# Patient Record
Sex: FEMALE | Race: ASIAN | Employment: FULL TIME | URBAN - METROPOLITAN AREA
[De-identification: names, ages, dates, MRNs, and addresses within clinical notes are randomized per-mention and may not be internally consistent; named-entity substitution may affect disease eponyms.]

---

## 2017-05-06 ENCOUNTER — HOSPITAL ENCOUNTER (OUTPATIENT)
Dept: CT IMAGING | Facility: HOSPITAL | Age: 62
Discharge: HOME | End: 2017-05-06

## 2017-05-06 VITALS — SYSTOLIC BLOOD PRESSURE: 139 MMHG | HEART RATE: 64 BPM | DIASTOLIC BLOOD PRESSURE: 78 MMHG

## 2017-05-06 DIAGNOSIS — Z13.220 SCREENING CHOLESTEROL LEVEL: ICD-10-CM

## 2021-07-07 PROBLEM — H40.9 GLAUCOMA: Status: ACTIVE | Noted: 2021-07-07

## 2021-07-07 PROBLEM — M17.10 OA (OSTEOARTHRITIS) OF KNEE: Status: ACTIVE | Noted: 2018-08-24

## 2021-07-07 PROBLEM — D86.9 SARCOID: Status: ACTIVE | Noted: 2021-07-07

## 2021-07-07 PROBLEM — R73.09 BLOOD GLUCOSE ABNORMAL: Status: ACTIVE | Noted: 2019-09-01

## 2021-07-07 PROBLEM — M17.9 OA (OSTEOARTHRITIS) OF KNEE: Status: ACTIVE | Noted: 2018-08-24

## 2021-07-07 PROBLEM — R73.09 BLOOD GLUCOSE ABNORMAL: Status: RESOLVED | Noted: 2019-09-01 | Resolved: 2021-07-07

## 2021-07-07 PROBLEM — R73.9 HYPERGLYCEMIA: Status: ACTIVE | Noted: 2021-07-07

## 2021-07-07 PROBLEM — E78.00 PURE HYPERCHOLESTEROLEMIA: Status: ACTIVE | Noted: 2018-10-24

## 2021-08-09 ENCOUNTER — TELEPHONE (OUTPATIENT)
Dept: SURGERY | Facility: CLINIC | Age: 66
End: 2021-08-09

## 2021-08-09 NOTE — TELEPHONE ENCOUNTER
Pt calling to schedule NPOV for tinnitus and abnormal MRI.   Pls review MRI results and advise if pt should see Neurosurgery or IR

## 2021-08-24 ENCOUNTER — OFFICE VISIT (OUTPATIENT)
Dept: SURGERY | Facility: CLINIC | Age: 66
End: 2021-08-24
Payer: MEDICARE

## 2021-08-24 VITALS — HEART RATE: 64 BPM | DIASTOLIC BLOOD PRESSURE: 86 MMHG | SYSTOLIC BLOOD PRESSURE: 140 MMHG

## 2021-08-24 DIAGNOSIS — R93.0 ABNORMAL MRI OF HEAD: ICD-10-CM

## 2021-08-24 DIAGNOSIS — H93.11 TINNITUS OF RIGHT EAR: Primary | ICD-10-CM

## 2021-08-24 PROBLEM — H59.023: Status: ACTIVE | Noted: 2018-11-18

## 2021-08-24 PROCEDURE — 99204 OFFICE O/P NEW MOD 45 MIN: CPT | Performed by: RADIOLOGY

## 2021-08-24 NOTE — PROGRESS NOTES
8/24/2021      Dear Gaby Ball,  I had the pleasure of seeing your patient, Jayla Cervantes today,8/24/2021   .   SUBJECTIVE     Chief Complaint: Steve Koehler is a 77year old female here today for consultation for Neurologic Problem (NP referred for alea a idiopathic intracranial hypertension. She denies any significant personal headache history. Other than the headache in January 2021 in the right temple region, she has not had any more daily headaches. That she denies any visual symptoms.     Past Medi Systems:     Hand Dominance: right  General: no symptoms reported  Neuro: no symptoms reported  Head: dizziness/spinning and ringing in ears  Dizziness treated by PCP-pt doing PT  Musculoskeletal: no symptoms reported  Cardiovascular: no symptoms reported Pulmonary:      Effort: Pulmonary effort is normal. No respiratory distress. Breath sounds: Normal breath sounds. No stridor. No wheezing. Musculoskeletal:         General: No swelling, tenderness, deformity or signs of injury.  Normal range of m inflammatory changes, progressed as compared to the prior CT exam.   5. Minimal white matter disease burden.  Leukoariosis scale (Fazekas):Grade 1 (Mild): focal or   punctate lesions  NO abnormal restricted diffusion is seen to suggest acute territorial isc cause of her symptoms. 3.   In order to both exclude a skull base arteriovenous shunt and screen for any venous abnormalities, I have asked her to obtain an MRA of the head and an MR venogram of the head, both without contrast.  This will be a reasonably

## 2021-08-24 NOTE — PROGRESS NOTES
Review of Systems:    Hand Dominance: right  General: no symptoms reported  Neuro: no symptoms reported  Head: dizziness/spinning and ringing in ears  Dizziness treated by PCP-pt doing PT  Musculoskeletal: no symptoms reported  Cardiovascular: no symptoms

## 2021-09-20 PROBLEM — R93.0 ABNORMAL MRI OF HEAD: Status: RESOLVED | Noted: 2021-08-24 | Resolved: 2021-09-20

## 2021-09-20 PROBLEM — R73.9 HYPERGLYCEMIA: Status: RESOLVED | Noted: 2021-07-07 | Resolved: 2021-09-20

## 2021-09-20 PROBLEM — H59.023: Status: RESOLVED | Noted: 2018-11-18 | Resolved: 2021-09-20

## 2024-01-18 ENCOUNTER — HOSPITAL ENCOUNTER (EMERGENCY)
Age: 69
Discharge: HOME | End: 2024-01-18
Attending: EMERGENCY MEDICINE
Payer: MEDICARE

## 2024-01-18 VITALS
HEART RATE: 64 BPM | HEIGHT: 62 IN | RESPIRATION RATE: 16 BRPM | DIASTOLIC BLOOD PRESSURE: 68 MMHG | BODY MASS INDEX: 26.68 KG/M2 | WEIGHT: 145 LBS | OXYGEN SATURATION: 97 % | TEMPERATURE: 99 F | SYSTOLIC BLOOD PRESSURE: 121 MMHG

## 2024-01-18 DIAGNOSIS — R04.0 EPISTAXIS: Primary | ICD-10-CM

## 2024-01-18 LAB
HCT VFR BLD AUTO: 39.4 %
HGB BLD-MCNC: 12.6 G/DL

## 2024-01-18 PROCEDURE — 99283 EMERGENCY DEPT VISIT LOW MDM: CPT

## 2024-01-18 PROCEDURE — 85014 HEMATOCRIT: CPT | Performed by: NURSE PRACTITIONER

## 2024-01-18 PROCEDURE — 85018 HEMOGLOBIN: CPT | Performed by: NURSE PRACTITIONER

## 2024-01-18 PROCEDURE — 36415 COLL VENOUS BLD VENIPUNCTURE: CPT

## 2024-01-19 NOTE — ED PROVIDER NOTES
I have seen and examined the patient and agree with the assessment and plan as outlined in the documentation by the physician's assistant. I provided a substantive portion of care for this patient. I personally performed the Medical Decision Making for this encounter.  The patient presents with complaint of epistaxis but no active bleeding at this time.  She has had history of sinus disease in the past.  Physical exam shows no active bleeding at this time.  She does have nasal septal irritation without active bleeding.  No posterior pharyngeal bleeding.  No bruising on the skin.  Discussed at this time that the patient is not actively bleeding and she should follow-up with ENT if she does have continued issues with her sinuses.  We did assess the patient's hemoglobin while here which was normal as well as her hematocrit.  Without active bleeding she is able to be sent home.  Agree with the plan from the [facility].  Discharge good condition.

## 2024-01-19 NOTE — ED INITIAL ASSESSMENT (HPI)
Pt to ed with c/o nosebleed x 1 day, states she passed clots at 15:00 and headache. no bleeding in triage currently

## 2024-01-23 NOTE — ED PROVIDER NOTES
Patient Seen in: [facility] In [place]      History     Chief Complaint   Patient presents with    Nose Bleed     Stated Complaint: nose bleed all day. bleeding controlled in triage.    Subjective:   HPI  Patient is a 68-year-old female with chronic sinusitis who is here today with complaints of epistaxis which started over the past couple of days.  Reports that she started noting a small amount of blood with blowing her nose over the past couple of days, she reports that today her nose started to bleed, was bleeding ongoing for approximately 20 to 30 minutes.  She states that it stopped for a little bit and then started again.  She reports that she finally got the bleeding to stop however she is now nervous that it is going to start again.  She has no bleeding upon arrival in the emergency department.    Objective:   Past Medical History:   Diagnosis Date    Cataract     Chronic sinusitis     Hyperglycemia     Hyperlipidemia     Osteopenia               Past Surgical History:   Procedure Laterality Date    BENIGN BIOPSY RIGHT      30yrs ago    OTHER Right     lumpectomy 30-34 yrs ago    SINUS SURGERY        TONSILLECTOMY                  Social History     Socioeconomic History    Marital status:     Number of children: 2   Occupational History    Occupation: nurse     Comment: retired [employer]   Tobacco Use    Smoking status: Never    Smokeless tobacco: Never   Vaping Use    Vaping Use: Never used   Substance and Sexual Activity    Alcohol use: Not Currently    Drug use: Never              Review of Systems    Positive for stated complaint: nose bleed all day. bleeding controlled in triage.  Other systems are as noted in HPI.  Constitutional and vital signs reviewed.      All other systems reviewed and negative except as noted above.    Physical Exam     ED Triage Vitals [01/18/24 1830]   BP (!) 163/86   Pulse 72   Resp 18   Temp 99.4 °F (37.4 °C)   Temp src Temporal   SpO2 99 %   O2 Device  None (Room air)       Current:/68   Pulse 64   Temp 99.4 °F (37.4 °C) (Temporal)   Resp 16   Ht 157.5 cm (5' 2\")   Wt 65.8 kg   SpO2 97%   BMI 26.52 kg/m²         Physical Exam    Adult physical exam:     VS: Vital signs reviewed. O2 saturation within normal limits for this patient     General: Patient is awake and alert, oriented to person, place and time. Not in acute distress.      HEENT: Head is normocephalic atraumatic. Pupils reactive bilaterally.  EOMs intact.  No facial droop or slurred speech.  No oral pallor. Mucous membranes moist.  Patient has no bleeding noted, no dried blood noted to the Neer's.  She has some mild to moderate septal irritation.  There is no posterior bleeding.    Lungs: good inspiratory effort. +air entry bilaterally without wheezes, rhonchi, crackles.  No accessory muscle use or tachypnea.       Extremities: No edema.  Pulses 2+ extremities.   Brisk capillary refill noted.      Skin: Normal skin turgor, there is no ecchymosis noted.    CNS: Moves all 4 extremities.  Interacts appropriately.  No tremor.  No gait abnormality        ED Course     Labs Reviewed   HEMOGLOBIN + HEMATOCRIT - Normal   RAINBOW DRAW LIGHT GREEN   RAINBOW DRAW BLUE             I have personally  reviewed available prior medical records for any recent pertinent discharge summaries/testing. Patient/family updated on results and plan, a verbalized understanding and agreement with the plan.  I explained to the patient that emergent conditions may arise and to go to the ER for new, worsening or any persistent conditions. I've explained the importance of taking all medicatons as prescribed, follow up, and return precuations,  All questions answered.    Please note that this report has been produced using speech recognition software and may contain errors related to that system including, but not limited to, errors in grammar, punctuation, and spelling, as well as words and phrases that possibly may have  been recognized inappropriately.  If there are any questions or concerns, contact the dictating provider for clarification.       MDM      Patient presents for epistaxis.  No history of coagulopathies. Patient is not on blood thinners.  No history of nasal trauma.  Bleeding has been controlled in the emergency room by applying pressure.  No active bleed at this time.  No headache, dizziness or signs of symptomatic anemia.  H&H are within normal limits blood pressure is not elevated.  Recommend the patient use a nasal saline spray with or without application of petroleum jelly to prevent dryness.  Recommend follow-up with PCP.  Return to ED precautions discussed with the patient                                     MDM    Disposition and Plan     Clinical Impression:  1. Epistaxis         Disposition:  Discharge  1/18/2024  7:25 pm    Follow-up:  Radha Drew MD  [address] [place] [address] [id number] [phone]    Follow up            Medications Prescribed:  Discharge Medication List as of 1/18/2024  7:25 PM

## (undated) NOTE — LETTER
8/24/2021 8/24/2021      Dear Rylee Tejada,  I had the pleasure of seeing your patient, Tony Romeo today,8/24/2021   .   SUBJECTIVE     Chief Complaint: Jerod Hassan is a 77year old female here today for consultation for Neurologic Problem (NP referr possibly suggestive of idiopathic intracranial hypertension. She denies any significant personal headache history. Other than the headache in January 2021 in the right temple region, she has not had any more daily headaches.   That she denies any visual Review of Systems:     Hand Dominance: right  General: no symptoms reported  Neuro: no symptoms reported  Head: dizziness/spinning and ringing in ears  Dizziness treated by PCP-pt doing PT  Musculoskeletal: no symptoms reported  Cardiovascular: no symp murmur heard. Pulmonary:      Effort: Pulmonary effort is normal. No respiratory distress. Breath sounds: Normal breath sounds. No stridor. No wheezing. Musculoskeletal:         General: No swelling, tenderness, deformity or signs of injury.  Nor sinus inflammatory changes, progressed as compared to the prior CT exam.   5. Minimal white matter disease burden.  Leukoariosis scale (Fazekas):Grade 1 (Mild): focal or   punctate lesions  NO abnormal restricted diffusion is seen to suggest acute territori the cause of her symptoms. 3.   In order to both exclude a skull base arteriovenous shunt and screen for any venous abnormalities, I have asked her to obtain an MRA of the head and an MR venogram of the head, both without contrast.  This will be a reason